# Patient Record
Sex: FEMALE | Race: ASIAN | NOT HISPANIC OR LATINO | URBAN - METROPOLITAN AREA
[De-identification: names, ages, dates, MRNs, and addresses within clinical notes are randomized per-mention and may not be internally consistent; named-entity substitution may affect disease eponyms.]

---

## 2023-03-14 ENCOUNTER — EMERGENCY (EMERGENCY)
Facility: HOSPITAL | Age: 58
LOS: 1 days | Discharge: HOME | End: 2023-03-14
Attending: EMERGENCY MEDICINE | Admitting: EMERGENCY MEDICINE
Payer: MEDICAID

## 2023-03-14 VITALS
RESPIRATION RATE: 18 BRPM | TEMPERATURE: 98 F | DIASTOLIC BLOOD PRESSURE: 89 MMHG | HEART RATE: 78 BPM | OXYGEN SATURATION: 100 % | SYSTOLIC BLOOD PRESSURE: 185 MMHG

## 2023-03-14 PROCEDURE — 99284 EMERGENCY DEPT VISIT MOD MDM: CPT

## 2023-03-14 NOTE — ED ADULT TRIAGE NOTE - CHIEF COMPLAINT QUOTE
Pt reporting to the ED for trip and fall. Pt reports hitting her head, no LOC or anticoagulation use. Pt has swelling to forehead. PMh of HTN and DM.

## 2023-03-15 VITALS
DIASTOLIC BLOOD PRESSURE: 54 MMHG | SYSTOLIC BLOOD PRESSURE: 115 MMHG | HEART RATE: 78 BPM | RESPIRATION RATE: 16 BRPM | OXYGEN SATURATION: 98 % | TEMPERATURE: 98 F

## 2023-03-15 PROCEDURE — 70450 CT HEAD/BRAIN W/O DYE: CPT | Mod: 26,MF

## 2023-03-15 PROCEDURE — G1004: CPT

## 2023-03-15 NOTE — ED ADULT NURSE NOTE - OBJECTIVE STATEMENT
58 y/o female, a&ox4, received to ED with c/o fall. Pt speaking in clear and coherent sentences. No signs of active bleeding or trauma. Respirations are even and unlabored, no signs of respiratory distress.

## 2023-03-15 NOTE — ED PROVIDER NOTE - CLINICAL SUMMARY MEDICAL DECISION MAKING FREE TEXT BOX
57-year-old female past medical history of hypertension diabetes senting after a mechanical fall at home.  Based on the location of the trauma I have low suspicion for intracranial injury however will get a CT scan to evaluate for fracture or bleed.  Most likely closed head injury with mild concussion.  Head injury precautions to be given the patient assuming CT scan is negative.  No pain or nausea at this time to warrant medication

## 2023-03-15 NOTE — ED ADULT NURSE NOTE - NSIMPLEMENTINTERV_GEN_ALL_ED
Implemented All Universal Safety Interventions:  [place] to call system. Call bell, personal items and telephone within reach. Instruct patient to call for assistance. Room bathroom lighting operational. Non-slip footwear when patient is off stretcher. Physically safe environment: no spills, clutter or unnecessary equipment. Stretcher in lowest position, wheels locked, appropriate side rails in place.

## 2023-03-15 NOTE — ED PROVIDER NOTE - PHYSICAL EXAMINATION
Vitals: I have reviewed the patients vital signs  General: nontoxic appearing  HEENT: normocephalic, airway patent there is a area of ecchymosis and swelling over the right forehead region, mild tender to palpation no step-offs.  Eyes: EOMI, tracking appropriately pupils equal round after light  Neck: no tracheal deviation  Chest/Lungs: no trauma, symmetric chest rise, speaking in complete sentences,  no resp distress  Heart: skin and extremities well perfused, regular rate and rhythm  Neuro: A+Ox3, appears non focal  MSK: no deformities  Skin: no cyanosis, no jaundice   Psych:  Normal mood and affect

## 2023-03-15 NOTE — ED PROVIDER NOTE - PATIENT PORTAL LINK FT
You can access the FollowMyHealth Patient Portal offered by [facility] by registering at the following website: [email or web address]. By joining [facility]’s FollowMyHealth portal, you will also be able to view your health information using other applications (apps) compatible with our system.

## 2023-03-15 NOTE — ED PROVIDER NOTE - NS ED ATTENDING STATEMENT MOD
This was a shared visit with the JUDE. I reviewed and verified the documentation and independently performed the documented:

## 2023-03-15 NOTE — ED ADULT NURSE REASSESSMENT NOTE - NS ED NURSE REASSESS COMMENT FT1
Pt a&ox4, denies pain at this time. NAD. Respirations are even and unlabored, no signs of respiratory distress.

## 2023-03-15 NOTE — ED PROVIDER NOTE - OBJECTIVE STATEMENT
57-year-old female with a past medical history of hypertension presenting to the emerged from today after a mechanical fall.  Per the son the patient she had finished playing in gym when she stood back up lost her balance fell forward and hit her head on the corner of a sharp piece of furniture.  There was no loss of consciousness.  The patient experienced some pain over the region where she struck her head and is also experiencing some nausea.  Both the symptoms have improved however since the injury.  There is no ataxia no problems with speech no weakness no other complaints at the current time no other injuries.  The patient is on baby aspirin for her  blood pressure.